# Patient Record
Sex: MALE
[De-identification: names, ages, dates, MRNs, and addresses within clinical notes are randomized per-mention and may not be internally consistent; named-entity substitution may affect disease eponyms.]

---

## 2019-04-18 PROBLEM — Z00.00 ENCOUNTER FOR PREVENTIVE HEALTH EXAMINATION: Status: ACTIVE | Noted: 2019-04-18

## 2019-09-05 ENCOUNTER — APPOINTMENT (OUTPATIENT)
Dept: OTOLARYNGOLOGY | Facility: CLINIC | Age: 74
End: 2019-09-05
Payer: COMMERCIAL

## 2019-09-05 VITALS — DIASTOLIC BLOOD PRESSURE: 80 MMHG | SYSTOLIC BLOOD PRESSURE: 158 MMHG

## 2019-09-05 DIAGNOSIS — Z86.39 PERSONAL HISTORY OF OTHER ENDOCRINE, NUTRITIONAL AND METABOLIC DISEASE: ICD-10-CM

## 2019-09-05 DIAGNOSIS — I10 ESSENTIAL (PRIMARY) HYPERTENSION: ICD-10-CM

## 2019-09-05 DIAGNOSIS — E11.9 TYPE 2 DIABETES MELLITUS W/OUT COMPLICATIONS: ICD-10-CM

## 2019-09-05 PROCEDURE — 92557 COMPREHENSIVE HEARING TEST: CPT

## 2019-09-05 PROCEDURE — 99204 OFFICE O/P NEW MOD 45 MIN: CPT | Mod: 25

## 2019-09-05 PROCEDURE — 92567 TYMPANOMETRY: CPT

## 2019-09-05 RX ORDER — ATORVASTATIN CALCIUM 80 MG/1
TABLET, FILM COATED ORAL
Refills: 0 | Status: ACTIVE | COMMUNITY

## 2019-09-05 RX ORDER — FINASTERIDE 1 MG/1
TABLET ORAL
Refills: 0 | Status: ACTIVE | COMMUNITY

## 2019-09-05 RX ORDER — ASPIRIN 325 MG/1
TABLET, FILM COATED ORAL
Refills: 0 | Status: ACTIVE | COMMUNITY

## 2019-09-05 NOTE — HISTORY OF PRESENT ILLNESS
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 Pt with right hearing aid- notes that he is lacking clarity in the words. Pt is an Occupational Therapist. \par \par Pt presents with right hearing loss for about 1 year (since Nov 2017). Gradual in onset. Pt with no history of ear infections, history of ear surgeries, tinnitus, otalgia, otorrhea, or vertigo. Feb 27 2019 - complete hearing loss AD - saw Dr Boston - got prednisone from Dr Fang -10 days - only single course - hearing came back - \par Pt with no head/otologic trauma, no chemo/IV abx/ototoxins, no significant loud noise exposure.\par Pt with right hearing aid- has had it for 1.5 years. \par Last audio was 5/1/19. There are no prior imaging studies- only CT of the sinuses. Pt with no family history of hearing loss. \par \par

## 2019-09-05 NOTE — CONSULT LETTER
[Dear  ___] : Dear  [unfilled], [Courtesy Letter:] : I had the pleasure of seeing your patient, [unfilled], in my office today. [Please see my note below.] : Please see my note below. [Consult Closing:] : Thank you very much for allowing me to participate in the care of this patient.  If you have any questions, please do not hesitate to contact me. [Sincerely,] : Sincerely, [FreeTextEntry2] : Jakub Valle MD [FreeTextEntry3] : Elder Arechiga MD, FACS\par Chair of Otolaryngology, [facility]\par Professor of Otolaryngology & Molecular Medicine, [employer] at [facility]\par \par

## 2019-09-05 NOTE — REASON FOR VISIT
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

## 2019-09-07 ENCOUNTER — TRANSCRIPTION ENCOUNTER (OUTPATIENT)
Age: 74
End: 2019-09-07

## 2019-09-14 ENCOUNTER — FORM ENCOUNTER (OUTPATIENT)
Age: 74
End: 2019-09-14

## 2019-09-15 ENCOUNTER — APPOINTMENT (OUTPATIENT)
Dept: MRI IMAGING | Facility: IMAGING CENTER | Age: 74
End: 2019-09-15
Payer: COMMERCIAL

## 2019-09-15 ENCOUNTER — OUTPATIENT (OUTPATIENT)
Dept: OUTPATIENT SERVICES | Facility: HOSPITAL | Age: 74
LOS: 1 days | End: 2019-09-15
Payer: COMMERCIAL

## 2019-09-15 DIAGNOSIS — H91.21 SUDDEN IDIOPATHIC HEARING LOSS, RIGHT EAR: ICD-10-CM

## 2019-09-15 PROCEDURE — 70553 MRI BRAIN STEM W/O & W/DYE: CPT | Mod: 26

## 2019-09-15 PROCEDURE — 70553 MRI BRAIN STEM W/O & W/DYE: CPT

## 2019-09-15 PROCEDURE — A9585: CPT

## 2019-10-02 ENCOUNTER — APPOINTMENT (OUTPATIENT)
Dept: OTOLARYNGOLOGY | Facility: CLINIC | Age: 74
End: 2019-10-02
Payer: COMMERCIAL

## 2019-10-02 VITALS — SYSTOLIC BLOOD PRESSURE: 153 MMHG | DIASTOLIC BLOOD PRESSURE: 92 MMHG

## 2019-10-02 DIAGNOSIS — H91.21 SUDDEN IDIOPATHIC HEARING LOSS, RIGHT EAR: ICD-10-CM

## 2019-10-02 PROCEDURE — 92567 TYMPANOMETRY: CPT

## 2019-10-02 PROCEDURE — 99214 OFFICE O/P EST MOD 30 MIN: CPT | Mod: 25

## 2019-10-02 PROCEDURE — 92557 COMPREHENSIVE HEARING TEST: CPT

## 2019-10-19 PROBLEM — H91.21 SUDDEN IDIOPATHIC HEARING LOSS OF RIGHT EAR WITH RESTRICTED HEARING OF LEFT EAR: Status: ACTIVE | Noted: 2019-09-05

## 2019-10-19 NOTE — HISTORY OF PRESENT ILLNESS
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 No c/o dizziness or tinnitus

## 2019-12-16 ENCOUNTER — APPOINTMENT (OUTPATIENT)
Dept: OTOLARYNGOLOGY | Facility: CLINIC | Age: 74
End: 2019-12-16
Payer: COMMERCIAL

## 2019-12-16 VITALS — SYSTOLIC BLOOD PRESSURE: 133 MMHG | HEART RATE: 84 BPM | DIASTOLIC BLOOD PRESSURE: 78 MMHG

## 2019-12-16 VITALS — HEIGHT: 68 IN | WEIGHT: 185 LBS | BODY MASS INDEX: 28.04 KG/M2

## 2019-12-16 PROCEDURE — 92567 TYMPANOMETRY: CPT

## 2019-12-16 PROCEDURE — 92557 COMPREHENSIVE HEARING TEST: CPT

## 2019-12-16 PROCEDURE — 99213 OFFICE O/P EST LOW 20 MIN: CPT | Mod: 25

## 2019-12-16 RX ORDER — PREDNISONE 10 MG/1
10 TABLET ORAL
Qty: 70 | Refills: 0 | Status: DISCONTINUED | COMMUNITY
Start: 2019-10-02 | End: 2019-12-16

## 2019-12-16 RX ORDER — CALCIUM CARBONATE/VITAMIN D3 600 MG-10
TABLET ORAL
Refills: 0 | Status: ACTIVE | COMMUNITY

## 2019-12-16 RX ORDER — PREDNISONE 10 MG/1
10 TABLET ORAL
Qty: 70 | Refills: 1 | Status: DISCONTINUED | COMMUNITY
Start: 2019-09-05 | End: 2019-12-16

## 2020-01-05 NOTE — REASON FOR VISIT
[Subsequent Evaluation] : a subsequent evaluation for [FreeTextEntry2] : follow up right ear hearing loss

## 2020-01-05 NOTE — PHYSICAL EXAM
[Midline] : trachea located in midline position [Normal] : orientation to person, place, and time: normal [de-identified] : wax removed AS

## 2020-01-05 NOTE — HISTORY OF PRESENT ILLNESS
[de-identified] : 74 year old male follow up right ear hearing loss.  States left ear deafness since 1984.  States completed prednisone as prescribed.  Feels no change or improvement in the hearing.  \par

## 2020-09-14 ENCOUNTER — APPOINTMENT (OUTPATIENT)
Dept: OTOLARYNGOLOGY | Facility: CLINIC | Age: 75
End: 2020-09-14

## 2021-08-30 ENCOUNTER — APPOINTMENT (OUTPATIENT)
Dept: OTOLARYNGOLOGY | Facility: CLINIC | Age: 76
End: 2021-08-30
Payer: MEDICARE

## 2021-08-30 VITALS — BODY MASS INDEX: 27.28 KG/M2 | WEIGHT: 180 LBS | HEIGHT: 68 IN

## 2021-08-30 DIAGNOSIS — H90.42 SENSORINEURAL HEARING LOSS, UNILATERAL, LEFT EAR, WITH UNRESTRICTED HEARING ON THE CONTRALATERAL SIDE: ICD-10-CM

## 2021-08-30 PROCEDURE — 92557 COMPREHENSIVE HEARING TEST: CPT

## 2021-08-30 PROCEDURE — 92567 TYMPANOMETRY: CPT

## 2021-08-30 PROCEDURE — 99213 OFFICE O/P EST LOW 20 MIN: CPT

## 2021-08-30 RX ORDER — ACETYLCYSTEINE 600 MG
600 CAPSULE ORAL DAILY
Qty: 90 | Refills: 6 | Status: DISCONTINUED | COMMUNITY
Start: 2019-10-02 | End: 2021-08-30

## 2021-08-30 RX ORDER — SITAGLIPTIN AND METFORMIN HYDROCHLORIDE 50; 1000 MG/1; MG/1
TABLET, FILM COATED ORAL
Refills: 0 | Status: DISCONTINUED | COMMUNITY
End: 2021-08-30

## 2021-08-30 RX ORDER — CANAGLIFLOZIN 300 MG/1
TABLET, FILM COATED ORAL
Refills: 0 | Status: DISCONTINUED | COMMUNITY
End: 2021-08-30

## 2021-08-30 RX ORDER — METFORMIN HYDROCHLORIDE 625 MG/1
TABLET ORAL
Refills: 0 | Status: ACTIVE | COMMUNITY

## 2021-08-30 RX ORDER — ICOSAPENT ETHYL 500 MG/1
CAPSULE ORAL
Refills: 0 | Status: DISCONTINUED | COMMUNITY
End: 2021-08-30

## 2021-09-06 PROBLEM — H90.42 SENSORINEURAL HEARING LOSS (SNHL) OF LEFT EAR WITH UNRESTRICTED HEARING OF RIGHT EAR: Status: ACTIVE | Noted: 2019-09-05

## 2021-09-06 NOTE — REASON FOR VISIT
[Subsequent Evaluation] : a subsequent evaluation for [FreeTextEntry2] : Follow up right ear hearing loss, deaf in left ear

## 2021-09-06 NOTE — PHYSICAL EXAM
[Midline] : trachea located in midline position [Normal] : no rashes [de-identified] : wax removed AS

## 2021-09-06 NOTE — HISTORY OF PRESENT ILLNESS
[de-identified] : 75 year old male Follow up right ear hearing loss, deaf in left ear.  Wearing right hearing aid.  Feels hearing may be decreasing in the right ear, clarity isn't as good.

## 2022-10-06 ENCOUNTER — APPOINTMENT (OUTPATIENT)
Dept: OTOLARYNGOLOGY | Facility: CLINIC | Age: 77
End: 2022-10-06

## 2022-10-06 VITALS
WEIGHT: 188 LBS | HEIGHT: 68 IN | SYSTOLIC BLOOD PRESSURE: 136 MMHG | BODY MASS INDEX: 28.49 KG/M2 | DIASTOLIC BLOOD PRESSURE: 80 MMHG | HEART RATE: 88 BPM

## 2022-10-06 PROCEDURE — 92557 COMPREHENSIVE HEARING TEST: CPT

## 2022-10-06 PROCEDURE — 99213 OFFICE O/P EST LOW 20 MIN: CPT

## 2022-10-06 PROCEDURE — 92567 TYMPANOMETRY: CPT

## 2022-10-06 RX ORDER — TAMSULOSIN HYDROCHLORIDE 0.4 MG/1
CAPSULE ORAL
Refills: 0 | Status: DISCONTINUED | COMMUNITY
End: 2022-10-06

## 2022-10-06 NOTE — DATA REVIEWED
[de-identified] : Right ear: Moderate to profound SNHL \par Left ear: Moderately-severe to profound SNHL \par Type Ad tymps Au

## 2022-10-06 NOTE — HISTORY OF PRESENT ILLNESS
[de-identified] : 76 year old man, follow up for stable Right asymmetrical SNHL - hearing aid clearance given - discussed options for Cochlear Implant. History of deaf in Left ear - interested in CI eval?  Reports hearing clarity has decreased, missing certain words when someone is speaking.  Patient is retired, would like something that provides additional amplification - uses closed caption when watching television most of the time.  Denies otalgia, otorrhea, tinnitus, dizziness, vertigo, headaches related to ears, recent fevers and ear infections. Using right Hearing Aid - \par \par **Recent placement of pacemaker 2/14/22

## 2022-12-05 ENCOUNTER — APPOINTMENT (OUTPATIENT)
Dept: PHARMACY | Facility: CLINIC | Age: 77
End: 2022-12-05

## 2023-10-30 ENCOUNTER — APPOINTMENT (OUTPATIENT)
Dept: PHARMACY | Facility: CLINIC | Age: 78
End: 2023-10-30
Payer: SELF-PAY

## 2023-10-30 PROCEDURE — V5010 ASSESSMENT FOR HEARING AID: CPT | Mod: NC

## 2023-11-20 ENCOUNTER — APPOINTMENT (OUTPATIENT)
Dept: OTOLARYNGOLOGY | Facility: CLINIC | Age: 78
End: 2023-11-20

## 2023-12-13 ENCOUNTER — NON-APPOINTMENT (OUTPATIENT)
Age: 78
End: 2023-12-13

## 2023-12-14 ENCOUNTER — APPOINTMENT (OUTPATIENT)
Dept: OTOLARYNGOLOGY | Facility: CLINIC | Age: 78
End: 2023-12-14
Payer: MEDICARE

## 2023-12-14 ENCOUNTER — APPOINTMENT (OUTPATIENT)
Dept: PHARMACY | Facility: CLINIC | Age: 78
End: 2023-12-14
Payer: SELF-PAY

## 2023-12-14 VITALS
WEIGHT: 178 LBS | SYSTOLIC BLOOD PRESSURE: 170 MMHG | BODY MASS INDEX: 26.98 KG/M2 | DIASTOLIC BLOOD PRESSURE: 84 MMHG | HEART RATE: 71 BPM | HEIGHT: 68 IN

## 2023-12-14 DIAGNOSIS — H90.3 SENSORINEURAL HEARING LOSS, BILATERAL: ICD-10-CM

## 2023-12-14 PROCEDURE — V5270A: CUSTOM

## 2023-12-14 PROCEDURE — 99213 OFFICE O/P EST LOW 20 MIN: CPT

## 2023-12-14 NOTE — HISTORY OF PRESENT ILLNESS
[de-identified] : 78 year old man, annual follow up for ASNHL. History of asymmetric sensorineural deafness - wearing hearing aid on Right - interested in newer technology. Patient refused hearing screen today.  Started wearing a new hearing aid - no change with hearing (changes amplification device every 3 years.)  Patient denies otalgia, otorrhea, tinnitus, dizziness, vertigo, headaches related to hearing, recent fevers and ear infections. SSNHL since 1980s AS - refusing audiogram today -

## 2024-06-28 ENCOUNTER — APPOINTMENT (OUTPATIENT)
Dept: PHARMACY | Facility: CLINIC | Age: 79
End: 2024-06-28
Payer: SELF-PAY

## 2024-06-28 PROCEDURE — V5299A: CUSTOM

## 2024-07-11 ENCOUNTER — APPOINTMENT (OUTPATIENT)
Dept: PODIATRY | Facility: CLINIC | Age: 79
End: 2024-07-11
Payer: MEDICARE

## 2024-07-11 DIAGNOSIS — M79.674 PAIN IN RIGHT TOE(S): ICD-10-CM

## 2024-07-11 DIAGNOSIS — E11.9 TYPE 2 DIABETES MELLITUS W/OUT COMPLICATIONS: ICD-10-CM

## 2024-07-11 DIAGNOSIS — B35.1 TINEA UNGUIUM: ICD-10-CM

## 2024-07-11 DIAGNOSIS — M79.675 PAIN IN RIGHT TOE(S): ICD-10-CM

## 2024-07-11 DIAGNOSIS — L85.1 ACQUIRED KERATOSIS [KERATODERMA] PALMARIS ET PLANTARIS: ICD-10-CM

## 2024-07-11 PROCEDURE — 11056 PARNG/CUTG B9 HYPRKR LES 2-4: CPT

## 2024-07-11 PROCEDURE — 11721 DEBRIDE NAIL 6 OR MORE: CPT | Mod: 59

## 2024-07-11 PROCEDURE — 99203 OFFICE O/P NEW LOW 30 MIN: CPT | Mod: 25

## 2024-07-11 RX ORDER — CICLOPIROX 2.28 G/ML
8 SOLUTION TOPICAL
Qty: 1 | Refills: 0 | Status: ACTIVE | COMMUNITY
Start: 2024-07-11 | End: 1900-01-01

## 2024-07-12 ENCOUNTER — APPOINTMENT (OUTPATIENT)
Dept: OTOLARYNGOLOGY | Facility: CLINIC | Age: 79
End: 2024-07-12

## 2024-07-12 ENCOUNTER — APPOINTMENT (OUTPATIENT)
Dept: PHARMACY | Facility: CLINIC | Age: 79
End: 2024-07-12
Payer: SELF-PAY

## 2024-07-12 PROCEDURE — V5299A: CUSTOM

## 2024-07-15 PROBLEM — M79.674 PAIN IN TOES OF BOTH FEET: Status: ACTIVE | Noted: 2024-07-15

## 2024-07-15 PROBLEM — L85.1 PLANTAR KERATOSIS, ACQUIRED: Status: ACTIVE | Noted: 2024-07-15

## 2024-07-15 PROBLEM — B35.1 ONYCHOMYCOSIS: Status: ACTIVE | Noted: 2024-07-15

## 2024-08-05 ENCOUNTER — APPOINTMENT (OUTPATIENT)
Dept: SPEECH THERAPY | Facility: CLINIC | Age: 79
End: 2024-08-05

## 2024-08-05 ENCOUNTER — OUTPATIENT (OUTPATIENT)
Dept: OUTPATIENT SERVICES | Facility: HOSPITAL | Age: 79
LOS: 1 days | Discharge: HOME | End: 2024-08-05

## 2024-08-06 NOTE — ASSESSMENT
[FreeTextEntry1] : Aided testing was completed using clinic Mr. Nuñez's own Oticon ANGEL for the right ear, and a clinic Resound Epifanio BTE hearing aid, programmed to NAL NL 2 targets, for the left ear. Aided responses to FM tones were obtained from 30 - 45 dBHL in the right aided condition and 35 - 70 dBHL in the left aided (right plugged) condition, from 500-4000 Hz.  Aided speech recognition testing was completed, using recorded test materials. Scores were as follows:  CNC words at 55 dBHL: Right aided- 60%  Left aided (right plugged)- 12%  AZ Bio sentences at 55 dBHL: Right aided- 63% Left aided (right plugged)- 0%  AZ Bio sentences at 55 dBHL + 10 SNR: Bilateral aided- 17%  Based on the results of the above testing, Mr. Nuñez is considered an audiological candidate for a cochlear implant. He is interested in pursuing cochlear implantation for the left ear.  Counseled patient regarding cochlear implant process and discussed realistic expectations, including that we cannot predict potential benefit from cochlear implantation. Explained that duration of hearing loss (40+ years) for the left ear, and lack of stimulation to this ear (no hearing aid use), can negatively impact outcomes. Discussed importance of full time/consistent use of equipment as well as Aural Rehabilitation after activation of cochlear implant. Provided patient with information from CI  and our Center.

## 2024-08-06 NOTE — PROCEDURE
[226 Hz] : 226 Hz [Type Ad Tympanogram] : Type Ad Hypermobile [de-identified] : Results of most recent audiological evaluation on 6/27/24 at outside facility indicated: Right ear: moderate to profound hearing loss Left ear: essentially profound hearing loss

## 2024-08-06 NOTE — HISTORY OF PRESENT ILLNESS
[FreeTextEntry1] : Mr. Nuñez, a 78-year-old male, was seen for a cochlear implant candidacy evaluation. Known asymmetrical SNHL, left ear worse than right ear. Patient reported onset of unilateral hearing loss for the left ear in [id number]. Tried hearing aid for the left ear for brief time period (days) in 2006, however did not continue use due to poor discrimination abilities and lack of benefit. Diagnosed with hearing loss for the right ear in 2018. Fit with hearing aid for the right ear at that time. Followed by Dr. Arechiga.

## 2024-08-19 ENCOUNTER — APPOINTMENT (OUTPATIENT)
Dept: OTOLARYNGOLOGY | Facility: CLINIC | Age: 79
End: 2024-08-19
Payer: MEDICARE

## 2024-08-19 VITALS
HEART RATE: 79 BPM | HEIGHT: 68 IN | SYSTOLIC BLOOD PRESSURE: 116 MMHG | DIASTOLIC BLOOD PRESSURE: 69 MMHG | WEIGHT: 177 LBS | BODY MASS INDEX: 26.83 KG/M2

## 2024-08-19 DIAGNOSIS — H90.3 SENSORINEURAL HEARING LOSS, BILATERAL: ICD-10-CM

## 2024-08-19 PROCEDURE — 99214 OFFICE O/P EST MOD 30 MIN: CPT

## 2024-08-19 RX ORDER — DAPAGLIFLOZIN 10 MG/1
TABLET, FILM COATED ORAL
Refills: 0 | Status: ACTIVE | COMMUNITY

## 2024-08-19 RX ORDER — LINAGLIPTIN AND METFORMIN HYDROCHLORIDE 2.5; 1 MG/1; MG/1
2.5-1 TABLET, FILM COATED ORAL
Refills: 0 | Status: ACTIVE | COMMUNITY

## 2024-08-20 NOTE — HISTORY OF PRESENT ILLNESS
[de-identified] : 77 yo M with hx of asymmetrical SNHL presents to discuss cochlear implant evaluation that was done on Aug 5. Continues to use hearing aid AD. Doesnt wear hearing aid AS and has not for over 40 years. No tinnitus, otalgia, otorrhea, ear infections, dizziness/vertigo or headaches related to hearing. Last vertigo attack about 15-20 years ago. No recent imaging of brain.

## 2024-08-20 NOTE — HISTORY OF PRESENT ILLNESS
[de-identified] : 79 yo M with hx of asymmetrical SNHL presents to discuss cochlear implant evaluation that was done on Aug 5. Continues to use hearing aid AD. Doesnt wear hearing aid AS and has not for over 40 years. No tinnitus, otalgia, otorrhea, ear infections, dizziness/vertigo or headaches related to hearing. Last vertigo attack about 15-20 years ago. No recent imaging of brain.

## 2024-08-29 DIAGNOSIS — H90.3 SENSORINEURAL HEARING LOSS, BILATERAL: ICD-10-CM
